# Patient Record
Sex: FEMALE | Race: WHITE
[De-identification: names, ages, dates, MRNs, and addresses within clinical notes are randomized per-mention and may not be internally consistent; named-entity substitution may affect disease eponyms.]

---

## 2018-07-11 NOTE — ULT
GALLBLADDER ULTRASOUND:

 

Date:  07/11/18 

 

A right upper quadrant ultrasound was done for evaluation of hyperbilirubinemia. 

 

FINDINGS:

The liver is somewhat large, measuring 17.0 cm in oblique sagittal length. Internally, no dilated alebr
ts or focal hepatic lesions seen. Portal venous flow was towards the liver as expected. Gallbladder h
as been surgically removed. The common bile duct is a normal 4.0 mm in caliber. Right kidney appears 
normal and is 12.0 cm in length. Visible portions of the pancreas were unremarkable, but not all area
s were seen. 

 

IMPRESSION: 

Mild hepatomegaly. No acute findings.  

 

 

POS: HOME

## 2018-10-01 NOTE — ULT
LEFT LOWER EXTREMITY VENOUS ULTRASOUND WITH DOPPLER:

 

HISTORY:

Pain.

 

COMPARISON:

None.

 

TECHNIQUE:

Gray-scale, color-flow, and Doppler imaging with spectral wave-form analysis was performed of the lef
t lower extremity venous system.

 

FINDINGS:

There is compressibility, presence of flow, and augmentation in the common femoral vein, femoral vein
, and popliteal vein.  There is flow in the posterior tibial vein.  There is flow in the greater saph
enous vein and popliteal vein.

 

In the popliteal fossa, there is a 1.4 x 1.3 x 3.9 cm anechoic focus, which may represent a Baker's c
yst.

 

IMPRESSION:

1.  No evidence of thrombus in the left lower extremity deep venous system.

 

2.  Probable Baker's cyst in the popliteal fossa.  Confirm with MRI. 

 

POS: PPP

## 2019-09-20 NOTE — ULT
RIGHT RENAL ULTRASOUND:

9/19/19

 

There has been a prior left nephrectomy. The remaining right kidney and bladder were shown on this st
udy. There is a prior history of a renal cyst and this exam was compared with the prior renal ultraso
und dated 9/18/17. 

 

Right kidney measures 12.7 x 4.1 x 4.8 cm. There is a small 1 cm cyst seen in the superior pole that 
appears simple in nature and is actually a bit smaller than measured on the prior study in 2017. The 
urinary bladder showed no internal defects  and emptied nearly completely with voiding. 

 

IMPRESSION: 

Stable exam showing no significant findings in the right kidney. The superior pole cyst measures 1.0 
cm today which is the same size or smaller than before. 

 

POS: HOME

## 2020-01-13 NOTE — CT
CT ABDOMEN NONCONTRAST

CT PELVIS NONCONTRAST:

(urolithiasis protocol)

 

DATE:  01/13/2020

 

HISTORY:

64-year-old female with microscopic hematuria, status post left nephrectomy. 

 

COMPARISON:

None. 

 

TECHNIQUE:

IV injection of iodinated contrast media: none

Oral contrast media: none

 

FINDINGS:

Other than for urolithiasis, the lack of IV and oral contrast limits the evaluation.

 

Left kidney is absent. Multiple surgical clips in the left renal bed and other areas of left retroper
itoneum. Clips in gallbladder fossa. No calculus in the right kidney, ureter, or bladder.  The 1 cm v
denae small right renal upper pole parenchymal cyst found on renal ultrasound is not visible on this no
ncontrast CT. No hydronephrosis. Within the limitations of the noncontrast scan, no obvious major pat
hology identified involving liver, right kidney, adrenals, pancreas, liver, or spleen. No colonic div
erticulitis. No small bowel dilation, ascites, or pneumoperitoneum. Lung bases are grossly clear. No 
obvious retroperitoneal lymphadenopathy. 

 

IMPRESSION:

1.  Status post left nephrectomy. 

2.  Status post cholecystectomy. 

3.  No urolithiasis or obstructive uropathy. 

 

 

JN R 

 

POS: OFF

## 2020-01-29 NOTE — OP
DATE OF PROCEDURE:  01/29/2020



PREOPERATIVE DIAGNOSES:  

1. A 64-year-old female, G3, P0, with history of urge incontinence refractory to

multiple overactive bladder medications. 

2. 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 Ellis, due to

nonfunctioning kidney. 

3. Microscopic hematuria.



POSTOPERATIVE DIAGNOSES:  

1. A 64-year-old female, G3, P0, with history of urge incontinence refractory to

multiple overactive bladder medications. 

2. 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 Ellis, due to

nonfunctioning kidney. 

3. Microscopic hematuria.



PROCEDURES PERFORMED:  Cystoscopy, right retrograde pyelogram, and Botox 
injection

100 international units. 



ANESTHESIA:  TIVA.



COMPLICATIONS:  None apparent.



DISPOSITION:  Recovery room in stable condition.



INDICATIONS FOR PROCEDURE AND HISTORY:  Ms. Camejo is a pleasant 64-year-old 
female,

registered nurse, who presented to [facility] due to severe OAB symptoms

refractory to medical therapy.  She underwent left nephrectomy by Dr. Ellis 
due to

nonfunctioning cystic kidney.  No evidence of malignancy.  She presents today 
for

cysto, right retrograde pyelogram for microscopic hematuria workup, and Botox

injection for refractory OAB.  Her upper tract imaging CT with noncontrast

demonstrates no significant pathology of concern. 



DESCRIPTION OF PROCEDURE:  After an informed consent was signed, the patient was

taken to the operating room, placed in a dorsal lithotomy position with the 
genital

area prepped and draped in the usual surgical sterile fashion.  Bilateral CASPER 
hose

and SCDs were provided.  Upon entering the bladder with a 21-Russian cystoscope,

there were no bladder tumors.  Right UO was identified in normal orthotopic

position.  A retrograde pyelogram was performed demonstrating no evidence of 
filling

defect.  Prompt excretion of contrast was noted.  Multiple left retroperitoneal

staples seen consistent with left nephrectomy.  Left UO remains in situ.  At 
this

time, we transitioned to our Botox injection.  Using Botox needle, setting at 4 
on

the needle, we injected 100 international units, with 20 injection increments.  
She

tolerated the procedure well with no significant bleeding noted.  She will be

monitored in Day Stay for voiding trial, discharged with Macrobid 100 mg 1 p.o.

b.i.d. for 3 days, Azo p.r.n.  Return to clinic on February 06, for PVR check. 







Job ID:  [id number]



[facility]

## 2020-01-29 NOTE — RAD
EXAM: Retrograde IVP



HISTORY: History of left nephrectomy. Microscopic hematuria



COMPARISON: CT abdomen/pelvis 1/13/2020



FINDINGS/IMPRESSION: Limited intraoperative fluoroscopic views of the retrograde IVP were submitted f
or interpretation. Surgical clips are seen in the left abdomen from prior left nephrectomy.

Contrast is seen in the right ureter. There is no evidence of hydronephrosis. No obvious filling defe
cts are seen.



Reported By: Joel Oconnor 

Electronically Signed:  1/29/2020 8:55 AM

## 2021-08-11 ENCOUNTER — HOSPITAL ENCOUNTER (EMERGENCY)
Dept: HOSPITAL 57 - BURERS | Age: 66
Discharge: HOME | End: 2021-08-11
Payer: MEDICARE

## 2021-08-11 DIAGNOSIS — R19.7: ICD-10-CM

## 2021-08-11 DIAGNOSIS — R11.2: Primary | ICD-10-CM

## 2021-08-11 DIAGNOSIS — T50.B95A: ICD-10-CM

## 2021-08-11 DIAGNOSIS — Z79.899: ICD-10-CM

## 2021-08-11 DIAGNOSIS — Z87.891: ICD-10-CM

## 2021-08-11 LAB
ALBUMIN SERPL BCG-MCNC: 3.9 G/DL (ref 3.4–4.8)
ALP SERPL-CCNC: 80 U/L (ref 40–110)
ALT SERPL W P-5'-P-CCNC: 16 U/L (ref 8–55)
ANION GAP SERPL CALC-SCNC: 13 MMOL/L (ref 10–20)
AST SERPL-CCNC: 14 U/L (ref 5–34)
BASOPHILS # BLD AUTO: 0.1 THOU/UL (ref 0–0.2)
BASOPHILS NFR BLD AUTO: 1.4 % (ref 0–1)
BILIRUB SERPL-MCNC: 2.7 MG/DL (ref 0.2–1.2)
BUN SERPL-MCNC: 11 MG/DL (ref 9.8–20.1)
CALCIUM SERPL-MCNC: 8.4 MG/DL (ref 7.8–10.44)
CHLORIDE SERPL-SCNC: 105 MMOL/L (ref 98–107)
CO2 SERPL-SCNC: 22 MMOL/L (ref 23–31)
CREAT CL PREDICTED SERPL C-G-VRATE: 0 ML/MIN (ref 70–130)
EOSINOPHIL # BLD AUTO: 0 THOU/UL (ref 0–0.7)
EOSINOPHIL NFR BLD AUTO: 0.4 % (ref 0–10)
GLOBULIN SER CALC-MCNC: 2.9 G/DL (ref 2.4–3.5)
GLUCOSE SERPL-MCNC: 99 MG/DL (ref 80–115)
HGB BLD-MCNC: 13.3 G/DL (ref 12–16)
LIPASE SERPL-CCNC: 16 U/L (ref 8–78)
LYMPHOCYTES # BLD AUTO: 0.7 THOU/UL (ref 1.2–3.4)
LYMPHOCYTES NFR BLD AUTO: 7.8 % (ref 21–51)
MCH RBC QN AUTO: 32.7 PG (ref 27–31)
MCV RBC AUTO: 93.6 FL (ref 78–98)
MONOCYTES # BLD AUTO: 0.5 THOU/UL (ref 0.11–0.59)
MONOCYTES NFR BLD AUTO: 5.6 % (ref 0–10)
NEUTROPHILS # BLD AUTO: 7.8 THOU/UL (ref 1.4–6.5)
NEUTROPHILS NFR BLD AUTO: 84.8 % (ref 42–75)
PLATELET # BLD AUTO: 168 THOU/UL (ref 130–400)
POTASSIUM SERPL-SCNC: 3.8 MMOL/L (ref 3.5–5.1)
RBC # BLD AUTO: 4.07 MILL/UL (ref 4.2–5.4)
SODIUM SERPL-SCNC: 136 MMOL/L (ref 136–145)
WBC # BLD AUTO: 9.2 THOU/UL (ref 4.8–10.8)

## 2021-08-11 PROCEDURE — 85025 COMPLETE CBC W/AUTO DIFF WBC: CPT

## 2021-08-11 PROCEDURE — 80053 COMPREHEN METABOLIC PANEL: CPT

## 2021-08-11 PROCEDURE — 83690 ASSAY OF LIPASE: CPT

## 2021-08-11 PROCEDURE — 96375 TX/PRO/DX INJ NEW DRUG ADDON: CPT

## 2021-08-11 PROCEDURE — 96374 THER/PROPH/DIAG INJ IV PUSH: CPT

## 2021-11-16 ENCOUNTER — HOSPITAL ENCOUNTER (EMERGENCY)
Dept: HOSPITAL 57 - BURERS | Age: 66
Discharge: ANOTHER HOSPITAL | End: 2021-11-16
Payer: MEDICARE

## 2021-11-16 DIAGNOSIS — Z87.891: ICD-10-CM

## 2021-11-16 DIAGNOSIS — I44.0: Primary | ICD-10-CM

## 2021-11-16 LAB
ALBUMIN SERPL BCG-MCNC: 4 G/DL (ref 3.4–4.8)
ALP SERPL-CCNC: 77 U/L (ref 40–110)
ALT SERPL W P-5'-P-CCNC: 20 U/L (ref 8–55)
ANION GAP SERPL CALC-SCNC: 12 MMOL/L (ref 10–20)
AST SERPL-CCNC: 16 U/L (ref 5–34)
BASOPHILS # BLD AUTO: 0.1 THOU/UL (ref 0–0.2)
BASOPHILS NFR BLD AUTO: 1.2 % (ref 0–1)
BILIRUB SERPL-MCNC: 2 MG/DL (ref 0.2–1.2)
BUN SERPL-MCNC: 15 MG/DL (ref 9.8–20.1)
CALCIUM SERPL-MCNC: 9.5 MG/DL (ref 7.8–10.44)
CHLORIDE SERPL-SCNC: 109 MMOL/L (ref 98–107)
CO2 SERPL-SCNC: 24 MMOL/L (ref 23–31)
CREAT CL PREDICTED SERPL C-G-VRATE: 0 ML/MIN (ref 70–130)
EOSINOPHIL # BLD AUTO: 0.1 THOU/UL (ref 0–0.7)
EOSINOPHIL NFR BLD AUTO: 1.8 % (ref 0–10)
GLOBULIN SER CALC-MCNC: 2.7 G/DL (ref 2.4–3.5)
GLUCOSE SERPL-MCNC: 103 MG/DL (ref 80–115)
HGB BLD-MCNC: 13.4 G/DL (ref 12–16)
LYMPHOCYTES # BLD AUTO: 2.5 THOU/UL (ref 1.2–3.4)
LYMPHOCYTES NFR BLD AUTO: 31.8 % (ref 21–51)
MCH RBC QN AUTO: 31.8 PG (ref 27–31)
MCV RBC AUTO: 91 FL (ref 78–98)
MONOCYTES # BLD AUTO: 0.5 THOU/UL (ref 0.11–0.59)
MONOCYTES NFR BLD AUTO: 6.1 % (ref 0–10)
NEUTROPHILS # BLD AUTO: 4.6 THOU/UL (ref 1.4–6.5)
NEUTROPHILS NFR BLD AUTO: 59.2 % (ref 42–75)
PLATELET # BLD AUTO: 202 THOU/UL (ref 130–400)
POTASSIUM SERPL-SCNC: 4.1 MMOL/L (ref 3.5–5.1)
RBC # BLD AUTO: 4.22 MILL/UL (ref 4.2–5.4)
SODIUM SERPL-SCNC: 141 MMOL/L (ref 136–145)
WBC # BLD AUTO: 7.7 THOU/UL (ref 4.8–10.8)

## 2021-11-16 PROCEDURE — 36415 COLL VENOUS BLD VENIPUNCTURE: CPT

## 2021-11-16 PROCEDURE — 71045 X-RAY EXAM CHEST 1 VIEW: CPT

## 2021-11-16 PROCEDURE — 80053 COMPREHEN METABOLIC PANEL: CPT

## 2021-11-16 PROCEDURE — 96374 THER/PROPH/DIAG INJ IV PUSH: CPT

## 2021-11-16 PROCEDURE — 94760 N-INVAS EAR/PLS OXIMETRY 1: CPT

## 2021-11-16 PROCEDURE — 93005 ELECTROCARDIOGRAM TRACING: CPT

## 2021-11-16 PROCEDURE — U0002 COVID-19 LAB TEST NON-CDC: HCPCS

## 2021-11-16 PROCEDURE — 83880 ASSAY OF NATRIURETIC PEPTIDE: CPT

## 2021-11-16 PROCEDURE — 85025 COMPLETE CBC W/AUTO DIFF WBC: CPT

## 2021-11-16 PROCEDURE — 84484 ASSAY OF TROPONIN QUANT: CPT

## 2021-11-17 ENCOUNTER — HOSPITAL ENCOUNTER (OUTPATIENT)
Dept: HOSPITAL 92 - CSHTELE | Age: 66
Setting detail: OBSERVATION
LOS: 1 days | Discharge: HOME | End: 2021-11-18
Attending: STUDENT IN AN ORGANIZED HEALTH CARE EDUCATION/TRAINING PROGRAM | Admitting: INTERNAL MEDICINE
Payer: MEDICARE

## 2021-11-17 VITALS — BODY MASS INDEX: 31.6 KG/M2

## 2021-11-17 DIAGNOSIS — Z90.710: ICD-10-CM

## 2021-11-17 DIAGNOSIS — E55.9: ICD-10-CM

## 2021-11-17 DIAGNOSIS — Z90.49: ICD-10-CM

## 2021-11-17 DIAGNOSIS — Z87.891: ICD-10-CM

## 2021-11-17 DIAGNOSIS — N18.2: ICD-10-CM

## 2021-11-17 DIAGNOSIS — I51.89: ICD-10-CM

## 2021-11-17 DIAGNOSIS — R07.9: Primary | ICD-10-CM

## 2021-11-17 DIAGNOSIS — E78.5: ICD-10-CM

## 2021-11-17 DIAGNOSIS — Q61.3: ICD-10-CM

## 2021-11-17 DIAGNOSIS — Z79.899: ICD-10-CM

## 2021-11-17 LAB
ANION GAP SERPL CALC-SCNC: 14 MMOL/L (ref 10–20)
BUN SERPL-MCNC: 15 MG/DL (ref 9.8–20.1)
CALCIUM SERPL-MCNC: 8.7 MG/DL (ref 7.8–10.44)
CHLORIDE SERPL-SCNC: 109 MMOL/L (ref 98–107)
CO2 SERPL-SCNC: 23 MMOL/L (ref 23–31)
CREAT CL PREDICTED SERPL C-G-VRATE: 0 ML/MIN (ref 70–130)
GLUCOSE SERPL-MCNC: 106 MG/DL (ref 80–115)
POTASSIUM SERPL-SCNC: 4.1 MMOL/L (ref 3.5–5.1)
SODIUM SERPL-SCNC: 142 MMOL/L (ref 136–145)

## 2021-11-17 PROCEDURE — 93010 ELECTROCARDIOGRAM REPORT: CPT

## 2021-11-17 PROCEDURE — G0378 HOSPITAL OBSERVATION PER HR: HCPCS

## 2021-11-17 PROCEDURE — 84484 ASSAY OF TROPONIN QUANT: CPT

## 2021-11-17 PROCEDURE — 83735 ASSAY OF MAGNESIUM: CPT

## 2021-11-17 PROCEDURE — 94760 N-INVAS EAR/PLS OXIMETRY 1: CPT

## 2021-11-17 PROCEDURE — 84443 ASSAY THYROID STIM HORMONE: CPT

## 2021-11-17 PROCEDURE — 83036 HEMOGLOBIN GLYCOSYLATED A1C: CPT

## 2021-11-17 PROCEDURE — 80061 LIPID PANEL: CPT

## 2021-11-17 PROCEDURE — 80048 BASIC METABOLIC PNL TOTAL CA: CPT

## 2021-11-17 PROCEDURE — 80053 COMPREHEN METABOLIC PANEL: CPT

## 2021-11-17 PROCEDURE — 96372 THER/PROPH/DIAG INJ SC/IM: CPT

## 2021-11-17 PROCEDURE — 85025 COMPLETE CBC W/AUTO DIFF WBC: CPT

## 2021-11-17 PROCEDURE — 93005 ELECTROCARDIOGRAM TRACING: CPT

## 2021-11-17 PROCEDURE — 84100 ASSAY OF PHOSPHORUS: CPT

## 2021-11-17 PROCEDURE — 93306 TTE W/DOPPLER COMPLETE: CPT

## 2021-11-17 PROCEDURE — 36415 COLL VENOUS BLD VENIPUNCTURE: CPT

## 2021-11-17 PROCEDURE — 96374 THER/PROPH/DIAG INJ IV PUSH: CPT

## 2021-11-17 RX ADMIN — Medication SCH UNITS: at 09:14

## 2021-11-17 RX ADMIN — HYDROCODONE BITARTRATE AND ACETAMINOPHEN PRN TAB: 5; 325 TABLET ORAL at 18:05

## 2021-11-17 RX ADMIN — ASPIRIN 81 MG CHEWABLE TABLET SCH MG: 81 TABLET CHEWABLE at 09:14

## 2021-11-17 RX ADMIN — HYDROCODONE BITARTRATE AND ACETAMINOPHEN PRN TAB: 5; 325 TABLET ORAL at 09:33

## 2021-11-18 VITALS — DIASTOLIC BLOOD PRESSURE: 49 MMHG | SYSTOLIC BLOOD PRESSURE: 109 MMHG | TEMPERATURE: 97.7 F

## 2021-11-18 LAB
ALBUMIN SERPL BCG-MCNC: 3.8 G/DL (ref 3.4–4.8)
ALP SERPL-CCNC: 69 U/L (ref 40–110)
ALT SERPL W P-5'-P-CCNC: 16 U/L (ref 8–55)
ANION GAP SERPL CALC-SCNC: 14 MMOL/L (ref 10–20)
AST SERPL-CCNC: 14 U/L (ref 5–34)
BASOPHILS # BLD AUTO: 0.1 10X3/UL (ref 0–0.2)
BASOPHILS NFR BLD AUTO: 0.6 % (ref 0–2)
BILIRUB SERPL-MCNC: 2.2 MG/DL (ref 0.2–1.2)
BUN SERPL-MCNC: 22 MG/DL (ref 9.8–20.1)
CALCIUM SERPL-MCNC: 8.8 MG/DL (ref 7.8–10.44)
CHD RISK SERPL-RTO: 5.3 (ref ?–4.5)
CHLORIDE SERPL-SCNC: 104 MMOL/L (ref 98–107)
CHOLEST SERPL-MCNC: 165 MG/DL
CO2 SERPL-SCNC: 25 MMOL/L (ref 23–31)
CREAT CL PREDICTED SERPL C-G-VRATE: 74 ML/MIN (ref 70–130)
EOSINOPHIL # BLD AUTO: 0.2 10X3/UL (ref 0–0.5)
EOSINOPHIL NFR BLD AUTO: 1.8 % (ref 0–6)
GLOBULIN SER CALC-MCNC: 2.8 G/DL (ref 2.4–3.5)
GLUCOSE SERPL-MCNC: 104 MG/DL (ref 80–115)
HDLC SERPL-MCNC: 31 MG/DL
HGB BLD-MCNC: 12.1 G/DL (ref 12–15.5)
LDLC SERPL CALC-MCNC: 94 MG/DL
LYMPHOCYTES NFR BLD AUTO: 36.4 % (ref 18–47)
MAGNESIUM SERPL-MCNC: 2.1 MG/DL (ref 1.6–2.6)
MCH RBC QN AUTO: 31.2 PG (ref 27–33)
MCV RBC AUTO: 92.3 FL (ref 81.6–98.3)
MONOCYTES # BLD AUTO: 0.5 10X3/UL (ref 0–1.1)
MONOCYTES NFR BLD AUTO: 6.5 % (ref 0–10)
NEUTROPHILS # BLD AUTO: 4.5 10X3/UL (ref 1.5–8.4)
NEUTROPHILS NFR BLD AUTO: 54.3 % (ref 40–75)
PLATELET # BLD AUTO: 195 10X3/UL (ref 150–450)
POTASSIUM SERPL-SCNC: 3.9 MMOL/L (ref 3.5–5.1)
RBC # BLD AUTO: 3.88 10X6/UL (ref 3.9–5.03)
SODIUM SERPL-SCNC: 139 MMOL/L (ref 136–145)
TRIGL SERPL-MCNC: 202 MG/DL (ref ?–150)
WBC # BLD AUTO: 8.3 10X3/UL (ref 3.5–10.5)

## 2021-11-18 RX ADMIN — Medication SCH UNITS: at 09:36

## 2021-11-18 RX ADMIN — ASPIRIN 81 MG CHEWABLE TABLET SCH MG: 81 TABLET CHEWABLE at 09:36

## 2022-01-05 ENCOUNTER — HOSPITAL ENCOUNTER (OUTPATIENT)
Dept: HOSPITAL 92 - MRI | Age: 67
Discharge: HOME | End: 2022-01-05
Payer: MEDICARE

## 2022-01-05 DIAGNOSIS — S43.431A: ICD-10-CM

## 2022-01-05 DIAGNOSIS — S46.911A: ICD-10-CM

## 2022-01-05 DIAGNOSIS — M25.511: Primary | ICD-10-CM

## 2022-01-05 DIAGNOSIS — M75.121: ICD-10-CM

## 2022-04-29 ENCOUNTER — HOSPITAL ENCOUNTER (EMERGENCY)
Dept: HOSPITAL 57 - BURERS | Age: 67
Discharge: HOME | End: 2022-04-29
Payer: MEDICARE

## 2022-04-29 DIAGNOSIS — Z87.891: ICD-10-CM

## 2022-04-29 DIAGNOSIS — B34.9: Primary | ICD-10-CM

## 2022-04-29 DIAGNOSIS — Z79.899: ICD-10-CM

## 2022-04-29 DIAGNOSIS — Z79.82: ICD-10-CM

## 2022-04-29 PROCEDURE — 99283 EMERGENCY DEPT VISIT LOW MDM: CPT

## 2022-04-29 PROCEDURE — 87804 INFLUENZA ASSAY W/OPTIC: CPT

## 2022-09-21 ENCOUNTER — HOSPITAL ENCOUNTER (OUTPATIENT)
Dept: HOSPITAL 57 - BURCT | Age: 67
Discharge: HOME | End: 2022-09-21
Attending: FAMILY MEDICINE
Payer: MEDICARE

## 2022-09-21 DIAGNOSIS — R31.9: Primary | ICD-10-CM

## 2022-09-21 DIAGNOSIS — K63.89: ICD-10-CM

## 2022-09-21 DIAGNOSIS — Z90.5: ICD-10-CM

## 2022-09-21 PROCEDURE — 74176 CT ABD & PELVIS W/O CONTRAST: CPT

## 2024-06-13 ENCOUNTER — HOSPITAL ENCOUNTER (OUTPATIENT)
Dept: HOSPITAL 92 - BICCT | Age: 69
Discharge: HOME | End: 2024-06-13
Payer: MEDICARE

## 2024-06-13 DIAGNOSIS — I25.84: ICD-10-CM

## 2024-06-13 DIAGNOSIS — Z12.2: Primary | ICD-10-CM

## 2024-06-13 DIAGNOSIS — F17.211: ICD-10-CM

## 2024-06-13 PROCEDURE — 71271 CT THORAX LUNG CANCER SCR C-: CPT

## 2025-07-29 ENCOUNTER — HOSPITAL ENCOUNTER (OUTPATIENT)
Dept: HOSPITAL 92 - BICULT | Age: 70
Discharge: HOME | End: 2025-07-29
Attending: PHYSICIAN ASSISTANT
Payer: MEDICARE

## 2025-07-29 DIAGNOSIS — Z48.816: Primary | ICD-10-CM

## 2025-07-29 DIAGNOSIS — Z90.5: ICD-10-CM

## 2025-07-29 DIAGNOSIS — N28.1: ICD-10-CM

## 2025-07-29 PROCEDURE — 76775 US EXAM ABDO BACK WALL LIM: CPT
